# Patient Record
Sex: MALE | Race: WHITE | NOT HISPANIC OR LATINO | Employment: OTHER | URBAN - NONMETROPOLITAN AREA
[De-identification: names, ages, dates, MRNs, and addresses within clinical notes are randomized per-mention and may not be internally consistent; named-entity substitution may affect disease eponyms.]

---

## 2023-11-24 PROBLEM — I95.9 HYPOTENSION: Status: ACTIVE | Noted: 2023-11-24

## 2023-11-24 PROBLEM — E66.9 OBESITY (BMI 35.0-39.9 WITHOUT COMORBIDITY): Status: ACTIVE | Noted: 2023-11-24

## 2023-11-24 PROBLEM — Z98.61 POST PTCA: Status: ACTIVE | Noted: 2023-11-24

## 2023-11-24 PROBLEM — Z79.01 ANTICOAGULATED: Status: ACTIVE | Noted: 2023-11-24

## 2023-11-24 PROBLEM — G47.30 SLEEP APNEA: Status: ACTIVE | Noted: 2023-11-24

## 2023-11-24 PROBLEM — E78.5 HYPERLIPIDEMIA: Status: ACTIVE | Noted: 2023-11-24

## 2023-11-24 PROBLEM — I48.0 PAROXYSMAL ATRIAL FIBRILLATION (MULTI): Status: ACTIVE | Noted: 2023-11-24

## 2023-11-24 PROBLEM — R00.2 PALPITATIONS: Status: ACTIVE | Noted: 2023-11-24

## 2023-11-24 PROBLEM — I25.10 SINGLE VESSEL CORONARY ARTERY DISEASE: Status: ACTIVE | Noted: 2023-11-24

## 2023-11-24 RX ORDER — CELECOXIB 200 MG/1
1 CAPSULE ORAL 2 TIMES DAILY
COMMUNITY
Start: 2021-11-29

## 2023-11-24 RX ORDER — METOPROLOL TARTRATE 25 MG/1
0.5 TABLET, FILM COATED ORAL 2 TIMES DAILY
COMMUNITY
Start: 2021-11-29 | End: 2023-11-30 | Stop reason: SDUPTHER

## 2023-11-24 RX ORDER — ASPIRIN 81 MG/1
1 TABLET ORAL DAILY
COMMUNITY

## 2023-11-24 RX ORDER — PREGABALIN 100 MG/1
1 CAPSULE ORAL NIGHTLY
COMMUNITY

## 2023-11-24 RX ORDER — ALBUTEROL SULFATE 90 UG/1
2 AEROSOL, METERED RESPIRATORY (INHALATION) EVERY 4 HOURS PRN
COMMUNITY
Start: 2022-06-03

## 2023-11-24 RX ORDER — ATORVASTATIN CALCIUM 20 MG/1
1 TABLET, FILM COATED ORAL DAILY
COMMUNITY
Start: 2022-04-08 | End: 2023-11-30 | Stop reason: SDUPTHER

## 2023-11-24 RX ORDER — TOLTERODINE 4 MG/1
1 CAPSULE, EXTENDED RELEASE ORAL DAILY
COMMUNITY
Start: 2022-04-08

## 2023-11-28 ENCOUNTER — APPOINTMENT (OUTPATIENT)
Dept: CARDIOLOGY | Facility: CLINIC | Age: 68
End: 2023-11-28
Payer: MEDICARE

## 2023-11-30 ENCOUNTER — OFFICE VISIT (OUTPATIENT)
Dept: CARDIOLOGY | Facility: CLINIC | Age: 68
End: 2023-11-30
Payer: MEDICARE

## 2023-11-30 VITALS
DIASTOLIC BLOOD PRESSURE: 78 MMHG | HEART RATE: 72 BPM | SYSTOLIC BLOOD PRESSURE: 136 MMHG | HEIGHT: 70 IN | BODY MASS INDEX: 37.22 KG/M2 | WEIGHT: 260 LBS

## 2023-11-30 DIAGNOSIS — I48.0 PAROXYSMAL ATRIAL FIBRILLATION (MULTI): Primary | ICD-10-CM

## 2023-11-30 DIAGNOSIS — I25.10 SINGLE VESSEL CORONARY ARTERY DISEASE: ICD-10-CM

## 2023-11-30 DIAGNOSIS — G47.33 OBSTRUCTIVE SLEEP APNEA SYNDROME: ICD-10-CM

## 2023-11-30 DIAGNOSIS — E78.2 MIXED HYPERLIPIDEMIA: ICD-10-CM

## 2023-11-30 DIAGNOSIS — I10 ESSENTIAL HYPERTENSION: ICD-10-CM

## 2023-11-30 PROBLEM — R23.3 EASY BRUISABILITY: Status: ACTIVE | Noted: 2023-11-30

## 2023-11-30 PROBLEM — R00.2 PALPITATIONS: Status: RESOLVED | Noted: 2023-11-24 | Resolved: 2023-11-30

## 2023-11-30 PROCEDURE — 1160F RVW MEDS BY RX/DR IN RCRD: CPT | Performed by: INTERNAL MEDICINE

## 2023-11-30 PROCEDURE — 3078F DIAST BP <80 MM HG: CPT | Performed by: INTERNAL MEDICINE

## 2023-11-30 PROCEDURE — 1036F TOBACCO NON-USER: CPT | Performed by: INTERNAL MEDICINE

## 2023-11-30 PROCEDURE — 1159F MED LIST DOCD IN RCRD: CPT | Performed by: INTERNAL MEDICINE

## 2023-11-30 PROCEDURE — 3008F BODY MASS INDEX DOCD: CPT | Performed by: INTERNAL MEDICINE

## 2023-11-30 PROCEDURE — 99214 OFFICE O/P EST MOD 30 MIN: CPT | Performed by: INTERNAL MEDICINE

## 2023-11-30 PROCEDURE — 3075F SYST BP GE 130 - 139MM HG: CPT | Performed by: INTERNAL MEDICINE

## 2023-11-30 RX ORDER — BISMUTH SUBSALICYLATE 262 MG
1 TABLET,CHEWABLE ORAL DAILY
COMMUNITY

## 2023-11-30 RX ORDER — METOPROLOL TARTRATE 25 MG/1
12.5 TABLET, FILM COATED ORAL 2 TIMES DAILY
Qty: 90 TABLET | Refills: 3 | Status: SHIPPED | OUTPATIENT
Start: 2023-11-30 | End: 2024-11-29

## 2023-11-30 RX ORDER — CLOPIDOGREL BISULFATE 75 MG/1
75 TABLET ORAL DAILY
COMMUNITY
End: 2023-11-30 | Stop reason: ALTCHOICE

## 2023-11-30 RX ORDER — ATORVASTATIN CALCIUM 20 MG/1
20 TABLET, FILM COATED ORAL DAILY
Qty: 90 TABLET | Refills: 3 | Status: SHIPPED | OUTPATIENT
Start: 2023-11-30 | End: 2024-11-29

## 2023-11-30 RX ORDER — LISINOPRIL 2.5 MG/1
2.5 TABLET ORAL DAILY
Qty: 90 TABLET | Refills: 3 | Status: SHIPPED | OUTPATIENT
Start: 2023-11-30 | End: 2024-11-29

## 2023-11-30 NOTE — PROGRESS NOTES
"Subjective   Guilherme Cummings is a 68 y.o. male       Chief Complaint    Follow-up          HPI   Patient is here for follow-up continue management for paroxysmal atrial fibrillation, coronary artery disease with PCI last year, hyperlipidemia, obesity and hypertension.  Since last time I saw him he denies any complaint of chest pain, palpitation, lightheadedness, dizziness or syncope he continues to complain of easy bruisability.  He reports he usually do his lab work through his family physician every 6 months.    Assessment     1. Paroxysmal atrial fibrillation currently in normal sinus rhythm.  Decided to stay on antiplatelet therapy and stopped his Eliquis in the past  2. Single-vessel coronary disease with recent PCI to the LAD last year  3. Hyperlipidemia on treatment  4. Sleep apnea with positive sleep study recently.  He report he could not tolerate CPAP  5. Obesity with a close to 16 pound weight loss  6. Hypertension controlled  7. Patient stopped his Eliquis following his PCI  8. Increased bruisability     Plan     1.  We reviewed the history of anticoagulation and antiplatelet.  Following lengthy discussion the patient elected for aspirin therapy with promise o consider going back to Eliquis if you go to atrial fibrillation in the future  2. I advised him to start lisinopril 2.5 mg daily  3. I advised him to follow-up with sleep clinic and consider other alternative for his sleep apnea  4. I counseled him regarding the importance of losing weight   5. Patient was educated about coronary artery disease and risk factor modification      Review of Systems   All other systems reviewed and are negative.         Visit Vitals  /78 (BP Location: Left arm, Patient Position: Sitting)   Pulse 72   Ht 1.778 m (5' 10\")   Wt 118 kg (260 lb)   BMI 37.31 kg/m²   Smoking Status Former   BSA 2.41 m²        Objective   Physical Exam  Constitutional:       Appearance: Normal appearance. He is normal weight.   HENT:      " Nose: Nose normal.   Neck:      Vascular: No carotid bruit.   Cardiovascular:      Rate and Rhythm: Normal rate.      Pulses: Normal pulses.      Heart sounds: Normal heart sounds.   Pulmonary:      Effort: Pulmonary effort is normal.   Abdominal:      General: Bowel sounds are normal.      Palpations: Abdomen is soft.   Genitourinary:     Rectum: Normal.   Musculoskeletal:         General: Normal range of motion.      Cervical back: Normal range of motion.      Right lower leg: No edema.      Left lower leg: No edema.   Skin:     General: Skin is warm and dry.   Neurological:      General: No focal deficit present.      Mental Status: He is alert.   Psychiatric:         Mood and Affect: Mood normal.         Behavior: Behavior normal.         Thought Content: Thought content normal.         Judgment: Judgment normal.         Current Medications    Current Outpatient Medications:     albuterol 90 mcg/actuation inhaler, Inhale 2 puffs every 4 hours if needed., Disp: , Rfl:     aspirin 81 mg EC tablet, Take 1 tablet (81 mg) by mouth once daily., Disp: , Rfl:     celecoxib (CeleBREX) 200 mg capsule, Take 1 capsule (200 mg) by mouth 2 times a day., Disp: , Rfl:     multivitamin tablet, Take 1 tablet by mouth once daily., Disp: , Rfl:     pregabalin (Lyrica) 100 mg capsule, Take 1 capsule (100 mg) by mouth once daily at bedtime., Disp: , Rfl:     tolterodine LA (Detrol LA) 4 mg 24 hr capsule, Take 1 capsule (4 mg) by mouth once daily., Disp: , Rfl:     atorvastatin (Lipitor) 20 mg tablet, Take 1 tablet (20 mg) by mouth once daily., Disp: 90 tablet, Rfl: 3    lisinopril 2.5 mg tablet, Take 1 tablet (2.5 mg) by mouth once daily., Disp: 90 tablet, Rfl: 3    metoprolol tartrate (Lopressor) 25 mg tablet, Take 0.5 tablets (12.5 mg) by mouth 2 times a day., Disp: 90 tablet, Rfl: 3                     Assessment/Plan   1. Paroxysmal atrial fibrillation (CMS/HCC)  Follow Up In Cardiology      2. Mixed hyperlipidemia  atorvastatin  (Lipitor) 20 mg tablet      3. Single vessel coronary artery disease  lisinopril 2.5 mg tablet    metoprolol tartrate (Lopressor) 25 mg tablet      4. Essential hypertension  lisinopril 2.5 mg tablet    metoprolol tartrate (Lopressor) 25 mg tablet      5. BMI 37.0-37.9, adult        6. Obstructive sleep apnea syndrome

## 2023-11-30 NOTE — PATIENT INSTRUCTIONS
Please bring all medicines, vitamins, and herbal supplements with you when you come to the office.    Prescriptions will not be filled unless you are compliant with your follow up appointments or have a follow up appointment scheduled as per instruction of your physician. Refills should be requested at the time of your visit.     Stop Plavix  Stay on Aspirin 81mg  Start Lisinopril 2.5mg daily  Follow up 9 month

## 2024-08-29 DIAGNOSIS — E78.2 MIXED HYPERLIPIDEMIA: ICD-10-CM

## 2024-08-29 RX ORDER — ATORVASTATIN CALCIUM 20 MG/1
20 TABLET, FILM COATED ORAL DAILY
Qty: 90 TABLET | Refills: 3 | Status: SHIPPED | OUTPATIENT
Start: 2024-08-29 | End: 2025-08-29

## 2024-09-06 ENCOUNTER — APPOINTMENT (OUTPATIENT)
Dept: CARDIOLOGY | Facility: CLINIC | Age: 69
End: 2024-09-06
Payer: MEDICARE

## 2024-09-06 VITALS
BODY MASS INDEX: 35.3 KG/M2 | WEIGHT: 246 LBS | SYSTOLIC BLOOD PRESSURE: 114 MMHG | DIASTOLIC BLOOD PRESSURE: 74 MMHG | HEART RATE: 64 BPM

## 2024-09-06 DIAGNOSIS — I48.0 PAROXYSMAL ATRIAL FIBRILLATION (MULTI): Primary | ICD-10-CM

## 2024-09-06 DIAGNOSIS — Z98.61 POST PTCA: ICD-10-CM

## 2024-09-06 DIAGNOSIS — Z01.810 PRE-OPERATIVE CARDIOVASCULAR EXAMINATION: ICD-10-CM

## 2024-09-06 DIAGNOSIS — Z87.891 FORMER SMOKER: ICD-10-CM

## 2024-09-06 DIAGNOSIS — E78.2 MIXED HYPERLIPIDEMIA: ICD-10-CM

## 2024-09-06 DIAGNOSIS — G47.33 OBSTRUCTIVE SLEEP APNEA SYNDROME: ICD-10-CM

## 2024-09-06 DIAGNOSIS — I25.10 SINGLE VESSEL CORONARY ARTERY DISEASE: ICD-10-CM

## 2024-09-06 DIAGNOSIS — I10 ESSENTIAL HYPERTENSION: ICD-10-CM

## 2024-09-06 PROBLEM — Z79.01 ANTICOAGULATED: Status: RESOLVED | Noted: 2023-11-24 | Resolved: 2024-09-06

## 2024-09-06 PROBLEM — R23.3 EASY BRUISABILITY: Status: RESOLVED | Noted: 2023-11-30 | Resolved: 2024-09-06

## 2024-09-06 PROBLEM — I95.9 HYPOTENSION: Status: RESOLVED | Noted: 2023-11-24 | Resolved: 2024-09-06

## 2024-09-06 PROCEDURE — 93000 ELECTROCARDIOGRAM COMPLETE: CPT | Performed by: INTERNAL MEDICINE

## 2024-09-06 PROCEDURE — 1036F TOBACCO NON-USER: CPT | Performed by: INTERNAL MEDICINE

## 2024-09-06 PROCEDURE — 3078F DIAST BP <80 MM HG: CPT | Performed by: INTERNAL MEDICINE

## 2024-09-06 PROCEDURE — 1160F RVW MEDS BY RX/DR IN RCRD: CPT | Performed by: INTERNAL MEDICINE

## 2024-09-06 PROCEDURE — 1159F MED LIST DOCD IN RCRD: CPT | Performed by: INTERNAL MEDICINE

## 2024-09-06 PROCEDURE — 99214 OFFICE O/P EST MOD 30 MIN: CPT | Performed by: INTERNAL MEDICINE

## 2024-09-06 PROCEDURE — 3074F SYST BP LT 130 MM HG: CPT | Performed by: INTERNAL MEDICINE

## 2024-09-06 RX ORDER — ROSUVASTATIN CALCIUM 40 MG/1
40 TABLET, COATED ORAL DAILY
Qty: 90 TABLET | Refills: 3 | Status: SHIPPED | OUTPATIENT
Start: 2024-09-06 | End: 2025-09-06

## 2024-09-06 NOTE — PATIENT INSTRUCTIONS
Please bring all medicines, vitamins, and herbal supplements with you when you come to the office.    Prescriptions will not be filled unless you are compliant with your follow up appointments or have a follow up appointment scheduled as per instruction of your physician. Refills should be requested at the time of your visit. Fall Prevention Education Given     BMI was above normal measurement. Current weight: 112 kg (246 lb)  Weight change since last visit (-) denotes wt loss -14 lbs   Weight loss needed to achieve BMI 25:   Lbs  Weight loss needed to achieve BMI 30:   Lbs  Provided instructions on dietary changes  Provided instructions on exercise.    Stop Lipitor  Start Crestor

## 2024-09-06 NOTE — LETTER
September 6, 2024     Ignacio Bertrand MD  [address] [facility] [address]  [facility] [address]    Patient: Guilherme Cummings   YOB: 1955   Date of Visit: 9/6/2024       Dear Dr. Ignacio Bertrand MD:    Thank you for referring Guilherme Cummings to me for evaluation. Below are my notes for this consultation.  If you have questions, please do not hesitate to call me. I look forward to following your patient along with you.       Sincerely,     John De Jesus MD      CC: No Recipients  ______________________________________________________________________________________    Subjective   Guilherme Cummings is a 69 y.o. male       Chief Complaint    Follow-up          HPI        Patient is here for follow-up continue management for paroxysmal atrial fibrillation, coronary artery disease, hyperlipidemia, obesity and hypertension.  Since last time I saw him he denies any cardiac complaint of chest pain, palpitation, lightheadedness, dizziness or syncope.  He remains reasonably active but hampered by knee problem.  He is entertaining the idea of proceeding with knee surgery.  His recent laboratory data indicating his lipids suboptimally controlled.  He report he was diagnosed with diabetes and his hemoglobin A1c above 7.  2 years ago stable no angina  Assessment     1. Paroxysmal atrial fibrillation currently in normal sinus rhythm.  Decided to stay on antiplatelet therapy and stopped his Eliquis in the past  2. Single-vessel coronary disease with recent PCI to the LAD last year stable no angina  3. Hyperlipidemia on treatment but not well-controlled  4. Sleep apnea with positive sleep study recently.  He report he could not tolerate CPAP  5. Obesity with a close to 12 pound weight loss  6. Hypertension controlled  7. Patient stopped his Eliquis following his PCI and elected for aspirin therapy  8. Increased bruisability     Plan     1.  We reviewed the history of anticoagulation and antiplatelet.  Following lengthy discussion  the patient elected for aspirin therapy with promise o consider going back to Eliis if you go to atrial fibrillation in the future  2. I advised him to switch atorvastatin to rosuvastatin 40 mg daily to optimize lipid profile and to repeat his lab work  3. I advised him to follow-up with sleep clinic and consider other alternative for his sleep apnea  4. I counseled him regarding the importance of losing weight   5. Patient was educated about coronary artery disease and risk factor modification  6.  I advised him that he can proceed with knee surgery.  His operative risk is acceptable  7.  I will see him back in 9 months     Review of Systems   All other systems reviewed and are negative.           Vitals:    09/06/24 0938   BP: 114/74   BP Location: Left arm   Patient Position: Sitting   Pulse: 64   Weight: 112 kg (246 lb)    EKG done in office today      Objective   Physical Exam  Constitutional:       Appearance: Normal appearance.   HENT:      Nose: Nose normal.   Neck:      Vascular: No carotid bruit.   Cardiovascular:      Rate and Rhythm: Normal rate.      Pulses: Normal pulses.      Heart sounds: Normal heart sounds.   Pulmonary:      Effort: Pulmonary effort is normal.   Abdominal:      General: Bowel sounds are normal.      Palpations: Abdomen is soft.   Musculoskeletal:         General: Normal range of motion.      Cervical back: Normal range of motion.      Right lower leg: No edema.      Left lower leg: No edema.   Skin:     General: Skin is warm and dry.   Neurological:      General: No focal deficit present.      Mental Status: He is alert.   Psychiatric:         Mood and Affect: Mood normal.         Behavior: Behavior normal.         Thought Content: Thought content normal.         Judgment: Judgment normal.         Allergies  Sulfa (sulfonamide antibiotics)     Current Medications    Current Outpatient Medications:   •  albuterol 90 mcg/actuation inhaler, Inhale 2 puffs every 4 hours if needed.,  Disp: , Rfl:   •  aspirin 81 mg EC tablet, Take 1 tablet (81 mg) by mouth once daily., Disp: , Rfl:   •  celecoxib (CeleBREX) 200 mg capsule, Take 1 capsule (200 mg) by mouth 2 times a day., Disp: , Rfl:   •  lisinopril 2.5 mg tablet, Take 1 tablet (2.5 mg) by mouth once daily., Disp: 90 tablet, Rfl: 3  •  metoprolol tartrate (Lopressor) 25 mg tablet, Take 0.5 tablets (12.5 mg) by mouth 2 times a day., Disp: 90 tablet, Rfl: 3  •  multivitamin tablet, Take 1 tablet by mouth once daily., Disp: , Rfl:   •  pregabalin (Lyrica) 100 mg capsule, Take 1 capsule (100 mg) by mouth once daily at bedtime., Disp: , Rfl:   •  tolterodine LA (Detrol LA) 4 mg 24 hr capsule, Take 1 capsule (4 mg) by mouth once daily., Disp: , Rfl:   •  rosuvastatin (Crestor) 40 mg tablet, Take 1 tablet (40 mg) by mouth once daily., Disp: 90 tablet, Rfl: 3                     Assessment/Plan   1. Paroxysmal atrial fibrillation (Multi)  Follow Up In Cardiology    Basic Metabolic Panel    Follow Up In Cardiology    ECG 12 Lead    Basic Metabolic Panel      2. Essential hypertension        3. Mixed hyperlipidemia  Lipid Panel    Alanine Aminotransferase    Aspartate Aminotransferase    rosuvastatin (Crestor) 40 mg tablet    Lipid Panel    Alanine Aminotransferase    Aspartate Aminotransferase      4. Post PTCA        5. Single vessel coronary artery disease        6. Pre-operative cardiovascular examination        7. Obstructive sleep apnea syndrome        8. BMI 35.0-35.9,adult        9. Former smoker                 Scribe Attestation  By signing my name below, Arlet TRUJILLO LPN, Scribe   attest that this documentation has been prepared under the direction and in the presence of John De Jesus MD.     Provider Attestation - Scribe documentation    All medical record entries made by the Scribe were at my direction and personally dictated by me. I have reviewed the chart and agree that the record accurately reflects my personal performance of the  history, physical exam, discussion and plan.

## 2024-09-06 NOTE — PROGRESS NOTES
Clinical Pharmacy - Warfarin Dosing Consult     Pharmacy has been consulted to manage this patient s warfarin therapy.  Indication: LVAD/RVAD  Therapy Goal: INR 2-3  Warfarin Prior to Admission: Yes  Warfarin PTA Regimen: 5 mg MWF, 7.5 mg ROW    INR   Date Value Ref Range Status   09/06/2022 2.49 (H) 0.85 - 1.15 Final   09/04/2022 2.64 (H) 0.85 - 1.15 Final     Factor 10 Chromogenic   Date Value Ref Range Status   08/23/2022 24 (L) 70 - 130 % Final       Recommend warfarin 5 mg today.  Pharmacy will monitor [place] daily and order warfarin doses to achieve specified goal.      Please contact pharmacy as soon as possible if the warfarin needs to be held for a procedure or if the warfarin goals change.      Yuliana Rader, PharmD, BCCCP     Subjective   Guilherme Cummings is a 69 y.o. male       Chief Complaint    Follow-up          HPI        Patient is here for follow-up continue management for paroxysmal atrial fibrillation, coronary artery disease, hyperlipidemia, obesity and hypertension.  Since last time I saw him he denies any cardiac complaint of chest pain, palpitation, lightheadedness, dizziness or syncope.  He remains reasonably active but hampered by knee problem.  He is entertaining the idea of proceeding with knee surgery.  His recent laboratory data indicating his lipids suboptimally controlled.  He report he was diagnosed with diabetes and his hemoglobin A1c above 7.  2 years ago stable no angina  Assessment     1. Paroxysmal atrial fibrillation currently in normal sinus rhythm.  Decided to stay on antiplatelet therapy and stopped his Eliquis in the past  2. Single-vessel coronary disease with recent PCI to the LAD last year stable no angina  3. Hyperlipidemia on treatment but not well-controlled  4. Sleep apnea with positive sleep study recently.  He report he could not tolerate CPAP  5. Obesity with a close to 12 pound weight loss  6. Hypertension controlled  7. Patient stopped his Eliquis following his PCI and elected for aspirin therapy  8. Increased bruisability     Plan     1.  We reviewed the history of anticoagulation and antiplatelet.  Following lengthy discussion the patient elected for aspirin therapy with promise o consider going back to Eliquis if you go to atrial fibrillation in the future  2. I advised him to switch atorvastatin to rosuvastatin 40 mg daily to optimize lipid profile and to repeat his lab work  3. I advised him to follow-up with sleep clinic and consider other alternative for his sleep apnea  4. I counseled him regarding the importance of losing weight   5. Patient was educated about coronary artery disease and risk factor modification  6.  I advised him that he can proceed with knee surgery.  His operative risk  is acceptable  7.  I will see him back in 9 months     Review of Systems   All other systems reviewed and are negative.           Vitals:    09/06/24 0938   BP: 114/74   BP Location: Left arm   Patient Position: Sitting   Pulse: 64   Weight: 112 kg (246 lb)    EKG done in office today      Objective   Physical Exam  Constitutional:       Appearance: Normal appearance.   HENT:      Nose: Nose normal.   Neck:      Vascular: No carotid bruit.   Cardiovascular:      Rate and Rhythm: Normal rate.      Pulses: Normal pulses.      Heart sounds: Normal heart sounds.   Pulmonary:      Effort: Pulmonary effort is normal.   Abdominal:      General: Bowel sounds are normal.      Palpations: Abdomen is soft.   Musculoskeletal:         General: Normal range of motion.      Cervical back: Normal range of motion.      Right lower leg: No edema.      Left lower leg: No edema.   Skin:     General: Skin is warm and dry.   Neurological:      General: No focal deficit present.      Mental Status: He is alert.   Psychiatric:         Mood and Affect: Mood normal.         Behavior: Behavior normal.         Thought Content: Thought content normal.         Judgment: Judgment normal.         Allergies  Sulfa (sulfonamide antibiotics)     Current Medications    Current Outpatient Medications:     albuterol 90 mcg/actuation inhaler, Inhale 2 puffs every 4 hours if needed., Disp: , Rfl:     aspirin 81 mg EC tablet, Take 1 tablet (81 mg) by mouth once daily., Disp: , Rfl:     celecoxib (CeleBREX) 200 mg capsule, Take 1 capsule (200 mg) by mouth 2 times a day., Disp: , Rfl:     lisinopril 2.5 mg tablet, Take 1 tablet (2.5 mg) by mouth once daily., Disp: 90 tablet, Rfl: 3    metoprolol tartrate (Lopressor) 25 mg tablet, Take 0.5 tablets (12.5 mg) by mouth 2 times a day., Disp: 90 tablet, Rfl: 3    multivitamin tablet, Take 1 tablet by mouth once daily., Disp: , Rfl:     pregabalin (Lyrica) 100 mg capsule, Take 1 capsule (100 mg) by mouth once daily  at bedtime., Disp: , Rfl:     tolterodine LA (Detrol LA) 4 mg 24 hr capsule, Take 1 capsule (4 mg) by mouth once daily., Disp: , Rfl:     rosuvastatin (Crestor) 40 mg tablet, Take 1 tablet (40 mg) by mouth once daily., Disp: 90 tablet, Rfl: 3                     Assessment/Plan   1. Paroxysmal atrial fibrillation (Multi)  Follow Up In Cardiology    Basic Metabolic Panel    Follow Up In Cardiology    ECG 12 Lead    Basic Metabolic Panel      2. Essential hypertension        3. Mixed hyperlipidemia  Lipid Panel    Alanine Aminotransferase    Aspartate Aminotransferase    rosuvastatin (Crestor) 40 mg tablet    Lipid Panel    Alanine Aminotransferase    Aspartate Aminotransferase      4. Post PTCA        5. Single vessel coronary artery disease        6. Pre-operative cardiovascular examination        7. Obstructive sleep apnea syndrome        8. BMI 35.0-35.9,adult        9. Former smoker                 Scribe Attestation  By signing my name below, Arlet TRUJILLO LPN, Scribe   attest that this documentation has been prepared under the direction and in the presence of John De Jesus MD.     Provider Attestation - Scribe documentation    All medical record entries made by the Scribe were at my direction and personally dictated by me. I have reviewed the chart and agree that the record accurately reflects my personal performance of the history, physical exam, discussion and plan.

## 2024-09-10 ENCOUNTER — TELEPHONE (OUTPATIENT)
Dept: CARDIOLOGY | Facility: CLINIC | Age: 69
End: 2024-09-10
Payer: MEDICARE

## 2024-09-10 NOTE — TELEPHONE ENCOUNTER
----- Message from John De Jesus sent at 9/9/2024  4:31 PM EDT -----  Acceptable surgical risk.  Can proceed okay to hold aspirin for 5 days 7 days  ----- Message -----  From: Norma Guido  Sent: 9/9/2024  12:22 PM EDT  To: John De Jesus MD

## 2024-09-10 NOTE — TELEPHONE ENCOUNTER
Left message with patient that he is cleared and ok to hold aspirin 5-7 days prior.     Printed and faxed to Dr Payne    [phone]

## 2024-10-14 PROCEDURE — 93010 ELECTROCARDIOGRAM REPORT: CPT | Performed by: INTERNAL MEDICINE

## 2024-12-14 DIAGNOSIS — I10 ESSENTIAL HYPERTENSION: ICD-10-CM

## 2024-12-14 DIAGNOSIS — I25.10 SINGLE VESSEL CORONARY ARTERY DISEASE: ICD-10-CM

## 2024-12-17 RX ORDER — LISINOPRIL 2.5 MG/1
2.5 TABLET ORAL DAILY
Qty: 90 TABLET | Refills: 3 | Status: SHIPPED | OUTPATIENT
Start: 2024-12-17 | End: 2025-12-17

## 2024-12-28 DIAGNOSIS — I25.10 SINGLE VESSEL CORONARY ARTERY DISEASE: ICD-10-CM

## 2024-12-28 DIAGNOSIS — I10 ESSENTIAL HYPERTENSION: ICD-10-CM

## 2025-01-02 RX ORDER — METOPROLOL TARTRATE 25 MG/1
TABLET, FILM COATED ORAL
Qty: 90 TABLET | Refills: 3 | Status: SHIPPED | OUTPATIENT
Start: 2025-01-02 | End: 2026-01-02

## 2025-06-06 ENCOUNTER — APPOINTMENT (OUTPATIENT)
Dept: CARDIOLOGY | Facility: CLINIC | Age: 70
End: 2025-06-06
Payer: MEDICARE

## 2025-06-09 PROCEDURE — 93306 TTE W/DOPPLER COMPLETE: CPT | Performed by: INTERNAL MEDICINE
